# Patient Record
Sex: MALE | Race: BLACK OR AFRICAN AMERICAN
[De-identification: names, ages, dates, MRNs, and addresses within clinical notes are randomized per-mention and may not be internally consistent; named-entity substitution may affect disease eponyms.]

---

## 2024-02-02 PROBLEM — Z00.00 ENCOUNTER FOR PREVENTIVE HEALTH EXAMINATION: Status: ACTIVE | Noted: 2024-02-02

## 2024-02-07 ENCOUNTER — APPOINTMENT (OUTPATIENT)
Dept: INFECTIOUS DISEASE | Facility: CLINIC | Age: 84
End: 2024-02-07
Payer: MEDICARE

## 2024-02-07 VITALS
WEIGHT: 116 LBS | HEART RATE: 38 BPM | OXYGEN SATURATION: 98 % | SYSTOLIC BLOOD PRESSURE: 122 MMHG | BODY MASS INDEX: 19.81 KG/M2 | TEMPERATURE: 97.7 F | DIASTOLIC BLOOD PRESSURE: 70 MMHG | HEIGHT: 64 IN

## 2024-02-07 DIAGNOSIS — Z78.9 OTHER SPECIFIED HEALTH STATUS: ICD-10-CM

## 2024-02-07 DIAGNOSIS — I10 ESSENTIAL (PRIMARY) HYPERTENSION: ICD-10-CM

## 2024-02-07 DIAGNOSIS — I63.9 CEREBRAL INFARCTION, UNSPECIFIED: ICD-10-CM

## 2024-02-07 DIAGNOSIS — F19.91 OTHER PSYCHOACTIVE SUBSTANCE USE, UNSPECIFIED, IN REMISSION: ICD-10-CM

## 2024-02-07 DIAGNOSIS — Z87.09 PERSONAL HISTORY OF OTHER DISEASES OF THE RESPIRATORY SYSTEM: ICD-10-CM

## 2024-02-07 DIAGNOSIS — I48.91 UNSPECIFIED ATRIAL FIBRILLATION: ICD-10-CM

## 2024-02-07 DIAGNOSIS — Z87.891 PERSONAL HISTORY OF NICOTINE DEPENDENCE: ICD-10-CM

## 2024-02-07 DIAGNOSIS — B19.20 UNSPECIFIED VIRAL HEPATITIS C W/OUT HEPATIC COMA: ICD-10-CM

## 2024-02-07 DIAGNOSIS — Z87.898 PERSONAL HISTORY OF OTHER SPECIFIED CONDITIONS: ICD-10-CM

## 2024-02-07 DIAGNOSIS — Z85.89 PERSONAL HISTORY OF MALIGNANT NEOPLASM OF OTHER ORGANS AND SYSTEMS: ICD-10-CM

## 2024-02-07 DIAGNOSIS — D09.9 CARCINOMA IN SITU, UNSPECIFIED: ICD-10-CM

## 2024-02-07 DIAGNOSIS — Z85.118 PERSONAL HISTORY OF OTHER MALIGNANT NEOPLASM OF BRONCHUS AND LUNG: ICD-10-CM

## 2024-02-07 DIAGNOSIS — Z86.79 PERSONAL HISTORY OF OTHER DISEASES OF THE CIRCULATORY SYSTEM: ICD-10-CM

## 2024-02-07 DIAGNOSIS — Z92.21 PERSONAL HISTORY OF ANTINEOPLASTIC CHEMOTHERAPY: ICD-10-CM

## 2024-02-07 PROCEDURE — 99205 OFFICE O/P NEW HI 60 MIN: CPT

## 2024-02-07 NOTE — REVIEW OF SYSTEMS
[Fever] : no fever [Chills] : no chills [Red Eyes] : eyes not red [Loss Of Hearing] : no hearing loss [Palpitations] : no palpitations [Shortness Of Breath] : no shortness of breath [Abdominal Pain] : no abdominal pain [Vomiting] : no vomiting [Dysuria] : no dysuria [Joint Pain] : no joint pain [Skin Lesions] : no skin lesions [Confused] : no confusion [Easy Bleeding] : no tendency for easy bleeding

## 2024-02-07 NOTE — PHYSICAL EXAM
[General Appearance - Alert] : alert [General Appearance - In No Acute Distress] : in no acute distress [Extraocular Movements] : extraocular movements were intact [Hearing Threshold Finger Rub Not Darke] : hearing was normal [Neck Appearance] : the appearance of the neck was normal [Edema] : there was no peripheral edema [FreeTextEntry1] : soft non tender no palpable hepatosplenomegaly [Musculoskeletal - Swelling] : no joint swelling [] : no rash [No Focal Deficits] : no focal deficits [Affect] : the affect was normal

## 2024-02-07 NOTE — ASSESSMENT
[FreeTextEntry1] : 83 M PMHx HTN, A Fib on Eliquis, COPD, PAD, invasive lung SCC stage IV diagnosed 2 months ago currently on weekly chemotherapy at [facility] with Dr Jostin Hutson, Hepatitis C (untreated) referred to ID office for Hepatitis C treatment.  Labs and imaging reviewed 9/19/23 Cr 1.2 ALT/AST 21/42   9/28/23 HCV NS5a subtype 1a, Daclatasvir, Ledipasvir, Ombitasvir, Elbasvir, Velpatasvir resistance NOT Predicted 9/28/23 HCV RNA Quant 6.3 LOG 2,010,000 IU/mL  [facility] labs reviewed--Hep B Surface AG NEG Core AB neg  9/8/23 US Abd Complete at [facility]--liver normal size, echogenicity increase, patent portal vein recanalization umbilical vein suggested, multiple GB stones, no GB wall thickening neg [place] CBD 8 mm, spleen normal size--no HSM, liver echogenicity may reflect steatosis, underlying parenchymal process, possible underlying portal HTN, kidney cysts--Radiologist Dr Sean Daniel  9/8/23 CT Chest w/o contrast [facility]--mass RLL hilar region multiple obstructed airways tree in bud nodules 2/2 inflammation/infection, multiple subcm nodules b/l lung fields, aneurysmal dilation thoracic aorta  He has been tolerating chemotherapy well, no fevers, chills, abdominal pain, cramping, jaundice.   Plan Discussed with them once acute phase of chemotherapy q weekly is completed and he gets into maintenance phase with q3 weeks chemotherapy then we can initiate treatment, him and his daughter were in agreement with this. Pending PET CT to determine further course, daughter will contact office when this is decided for follow up to initiate therapy.   once starting therapy---plan for Liver Elastography and Fibrosure panel to stage liver disease, will also refer to GI for upper endoscopy r/o varices if not done, will confirm w them at f./u  plan likely Ledipasvir/Sofosbuvir 90/400 PO daily for 12 weeks---will send to [employer] ([id number]) so they can coordinate regarding paper work and prior auth with central pharmacist    [Treatment Education] : treatment education [Treatment Adherence] : treatment adherence [Rx Dose / Side Effects] : Rx dose/side effects [Risk Reduction] : risk reduction [Medical Care Issues] : medical care issues [Drug Interactions / Side Effects] : drug interactions/side effects [Anticipatory Guidance] : anticipatory guidance

## 2024-02-07 NOTE — HISTORY OF PRESENT ILLNESS
[FreeTextEntry1] :  83 M PMHx HTN, A Fib on Eliquis, COPD, PAD, invasive lung SCC stage IV diagnosed 2 months ago currently on weekly chemotherapy at [facility] with Dr Jostin Hutson, Hepatitis C (untreated) referred to ID office for Hepatitis C treatment.  Labs and imaging reviewed 9/19/23 Cr 1.2 ALT/AST 21/42   9/28/23 HCV NS5a subtype 1a, Daclatasvir, Ledipasvir, Ombitasvir, Elbasvir, Velpatasvir resistance NOT Predicted 9/28/23 HCV RNA Quant 6.3 LOG 2,010,000 IU/mL  9/8/23 US Abd Complete at [facility]--liver normal size, echogenicity increase, patent portal vein recanalization umbilical vein suggested, multiple GB stones, no GB wall thickening neg [place] CBD 8 mm, spleen normal size--no HSM, liver echogenicity may reflect steatosis, underlying parenchymal process, possible underlying portal HTN, kidney cysts--Radiologist Dr Sean Daniel  9/8/23 CT Chest w/o contrast [facility]--mass RLL hilar region multiple obstructed airways tree in bud nodules 2/2 inflammation/infection, multiple subcm nodules b/l lung fields, aneurysmal dilation thoracic aorta  He has been tolerating chemotherapy well, no fevers, chills, abdominal pain, cramping, jaundice. Discussed with them once acute phase of chemotherapy q weekly is completed and he gets into maintenance phase with q3 weeks chemotherapy then we can initiate treatment, him and his daughter were in agreement with this. Pending PET CT to determine further course, daughter will contact office when this is decided for follow up to initiate therapy.

## 2024-02-07 NOTE — REASON FOR VISIT
[Consultation] : a consultation visit [Family Member] : family member [FreeTextEntry1] : Hep[facility] C, [facility]

## 2024-02-12 PROBLEM — F19.91 HISTORY OF ILLICIT DRUG USE: Status: ACTIVE | Noted: 2024-02-12

## 2024-02-12 PROBLEM — Z87.09 HISTORY OF ASTHMA: Status: RESOLVED | Noted: 2024-02-12 | Resolved: 2024-02-12

## 2024-02-12 PROBLEM — Z85.118 HISTORY OF MALIGNANT NEOPLASM OF LUNG: Status: RESOLVED | Noted: 2024-02-12 | Resolved: 2024-02-12

## 2024-02-12 PROBLEM — D09.9: Status: ACTIVE | Noted: 2024-02-07

## 2024-02-12 PROBLEM — Z87.09 HISTORY OF CHRONIC OBSTRUCTIVE LUNG DISEASE: Status: RESOLVED | Noted: 2024-02-12 | Resolved: 2024-02-12

## 2024-02-12 PROBLEM — Z86.79 HISTORY OF PERIPHERAL ARTERIAL DISEASE: Status: RESOLVED | Noted: 2024-02-12 | Resolved: 2024-02-12

## 2024-02-12 PROBLEM — B19.20 HEPATITIS-C: Status: ACTIVE | Noted: 2024-02-07

## 2024-02-12 PROBLEM — I63.9 STROKE: Status: ACTIVE | Noted: 2024-02-07

## 2024-02-12 PROBLEM — Z92.21 HISTORY OF CHEMOTHERAPY: Status: RESOLVED | Noted: 2024-02-12 | Resolved: 2024-02-12

## 2024-02-12 PROBLEM — Z85.89 HISTORY OF SQUAMOUS CELL CARCINOMA: Status: ACTIVE | Noted: 2024-02-07

## 2024-02-12 PROBLEM — I10 HYPERTENSION: Status: ACTIVE | Noted: 2024-02-07

## 2024-02-12 PROBLEM — I48.91 A-FIB: Status: ACTIVE | Noted: 2024-02-07

## 2024-02-12 PROBLEM — Z78.9 FAMILY HISTORY UNKNOWN: Status: ACTIVE | Noted: 2024-02-12

## 2024-02-12 PROBLEM — Z87.898 HISTORY OF SUBSTANCE USE DISORDER: Status: RESOLVED | Noted: 2024-02-12 | Resolved: 2024-02-12

## 2024-02-12 PROBLEM — Z78.9 CURRENT NON-DRINKER OF ALCOHOL: Status: ACTIVE | Noted: 2024-02-07

## 2024-02-12 PROBLEM — Z87.891 FORMER SMOKER: Status: ACTIVE | Noted: 2024-02-07

## 2024-02-12 RX ORDER — MEGESTROL ACETATE 40 MG/ML
40 SUSPENSION ORAL
Refills: 0 | Status: ACTIVE | COMMUNITY

## 2024-02-12 RX ORDER — CLOPIDOGREL BISULFATE 75 MG/1
75 TABLET, FILM COATED ORAL
Qty: 90 | Refills: 0 | Status: COMPLETED | COMMUNITY
Start: 2023-10-06

## 2024-02-12 RX ORDER — ONDANSETRON 8 MG/1
8 TABLET, ORALLY DISINTEGRATING ORAL
Qty: 30 | Refills: 0 | Status: COMPLETED | COMMUNITY
Start: 2023-11-13

## 2024-02-12 RX ORDER — METOPROLOL TARTRATE 100 MG/1
100 TABLET, FILM COATED ORAL TWICE DAILY
Refills: 0 | Status: COMPLETED | COMMUNITY

## 2024-02-12 RX ORDER — METOPROLOL SUCCINATE 100 MG/1
100 TABLET, EXTENDED RELEASE ORAL
Qty: 180 | Refills: 0 | Status: COMPLETED | COMMUNITY
Start: 2024-01-27

## 2024-02-12 RX ORDER — FOSNETUPITANT AND PALONOSETRON 260; .28 MG/20ML; MG/20ML
235-0.25 INJECTION INTRAVENOUS
Qty: 60 | Refills: 0 | Status: COMPLETED | COMMUNITY
Start: 2023-11-06

## 2024-02-12 RX ORDER — CIPROFLOXACIN HYDROCHLORIDE 500 MG/1
500 TABLET, FILM COATED ORAL
Qty: 7 | Refills: 0 | Status: COMPLETED | COMMUNITY
Start: 2023-12-01

## 2024-02-12 RX ORDER — AMOXICILLIN AND CLAVULANATE POTASSIUM 500; 125 MG/1; MG/1
500-125 TABLET, FILM COATED ORAL
Qty: 10 | Refills: 0 | Status: COMPLETED | COMMUNITY
Start: 2023-12-01

## 2024-02-12 RX ORDER — NIFEDIPINE 30 MG/1
30 TABLET, EXTENDED RELEASE ORAL
Refills: 0 | Status: ACTIVE | COMMUNITY

## 2024-02-12 RX ORDER — ALBUTEROL SULFATE 90 UG/1
INHALANT RESPIRATORY (INHALATION)
Refills: 0 | Status: ACTIVE | COMMUNITY

## 2024-02-12 RX ORDER — ONDANSETRON 8 MG/1
8 TABLET, ORALLY DISINTEGRATING ORAL
Refills: 0 | Status: ACTIVE | COMMUNITY
Start: 2023-11-13

## 2024-02-12 RX ORDER — PANTOPRAZOLE 40 MG/1
40 TABLET, DELAYED RELEASE ORAL
Qty: 90 | Refills: 0 | Status: ACTIVE | COMMUNITY
Start: 2023-12-04

## 2024-02-12 RX ORDER — ATORVASTATIN CALCIUM 10 MG/1
10 TABLET, FILM COATED ORAL
Refills: 0 | Status: ACTIVE | COMMUNITY

## 2024-02-12 RX ORDER — MULTIVIT-MIN/FOLIC/VIT K/LYCOP 400-300MCG
25 MCG TABLET ORAL
Refills: 0 | Status: ACTIVE | COMMUNITY

## 2024-02-12 RX ORDER — ENALAPRIL MALEATE 10 MG/1
10 TABLET ORAL
Refills: 0 | Status: ACTIVE | COMMUNITY

## 2024-02-12 RX ORDER — APIXABAN 2.5 MG/1
2.5 TABLET, FILM COATED ORAL TWICE DAILY
Refills: 0 | Status: ACTIVE | COMMUNITY

## 2024-02-12 RX ORDER — PEMBROLIZUMAB 25 MG/ML
100 INJECTION, SOLUTION INTRAVENOUS
Qty: 8 | Refills: 0 | Status: COMPLETED | COMMUNITY
Start: 2024-01-03

## 2024-02-12 RX ORDER — METOPROLOL SUCCINATE 100 MG/1
100 TABLET, EXTENDED RELEASE ORAL DAILY
Refills: 0 | Status: ACTIVE | COMMUNITY
Start: 2024-01-27

## 2024-02-12 RX ORDER — FLUTICASONE FUROATE, UMECLIDINIUM BROMIDE AND VILANTEROL TRIFENATATE 200; 62.5; 25 UG/1; UG/1; UG/1
200-62.5-25 POWDER RESPIRATORY (INHALATION)
Qty: 60 | Refills: 0 | Status: ACTIVE | COMMUNITY
Start: 2023-10-24